# Patient Record
Sex: MALE | Race: WHITE | NOT HISPANIC OR LATINO | Employment: OTHER | URBAN - METROPOLITAN AREA
[De-identification: names, ages, dates, MRNs, and addresses within clinical notes are randomized per-mention and may not be internally consistent; named-entity substitution may affect disease eponyms.]

---

## 2021-07-28 ENCOUNTER — HOSPITAL ENCOUNTER (EMERGENCY)
Facility: CLINIC | Age: 81
Discharge: HOME | End: 2021-07-28
Attending: PHYSICIAN ASSISTANT | Admitting: PHYSICIAN ASSISTANT
Payer: COMMERCIAL

## 2021-07-28 ENCOUNTER — APPOINTMENT (OUTPATIENT)
Dept: GENERAL RADIOLOGY | Facility: CLINIC | Age: 81
End: 2021-07-28
Attending: PHYSICIAN ASSISTANT
Payer: COMMERCIAL

## 2021-07-28 VITALS
SYSTOLIC BLOOD PRESSURE: 141 MMHG | OXYGEN SATURATION: 95 % | HEART RATE: 90 BPM | HEIGHT: 65 IN | WEIGHT: 160 LBS | TEMPERATURE: 98.3 F | DIASTOLIC BLOOD PRESSURE: 78 MMHG | BODY MASS INDEX: 26.66 KG/M2 | RESPIRATION RATE: 16 BRPM

## 2021-07-28 DIAGNOSIS — J06.9 URI (UPPER RESPIRATORY INFECTION): ICD-10-CM

## 2021-07-28 DIAGNOSIS — R05.9 COUGH: ICD-10-CM

## 2021-07-28 LAB — SARS-COV-2 RNA RESP QL NAA+PROBE: NEGATIVE

## 2021-07-28 PROCEDURE — 71045 X-RAY EXAM CHEST 1 VIEW: CPT

## 2021-07-28 PROCEDURE — 99213 OFFICE O/P EST LOW 20 MIN: CPT | Performed by: PHYSICIAN ASSISTANT

## 2021-07-28 PROCEDURE — C9803 HOPD COVID-19 SPEC COLLECT: HCPCS | Performed by: PHYSICIAN ASSISTANT

## 2021-07-28 PROCEDURE — 87635 SARS-COV-2 COVID-19 AMP PRB: CPT | Performed by: PHYSICIAN ASSISTANT

## 2021-07-28 PROCEDURE — G0463 HOSPITAL OUTPT CLINIC VISIT: HCPCS | Mod: 25 | Performed by: PHYSICIAN ASSISTANT

## 2021-07-28 ASSESSMENT — ENCOUNTER SYMPTOMS
RHINORRHEA: 1
ARTHRALGIAS: 0
CONFUSION: 0
COLOR CHANGE: 0
SHORTNESS OF BREATH: 0
FEVER: 0
EYE REDNESS: 0
WHEEZING: 0
NECK STIFFNESS: 0
CHEST TIGHTNESS: 0
COUGH: 1
ABDOMINAL PAIN: 0
HEADACHES: 0
DIFFICULTY URINATING: 0

## 2021-07-28 ASSESSMENT — MIFFLIN-ST. JEOR: SCORE: 1367.64

## 2021-07-28 NOTE — ED PROVIDER NOTES
History     Chief Complaint   Patient presents with     Cough     started yesterday     HPI  Mason Rutledge is a 80 year old male who presents today with cough, chest congestion, runny nose. Patient states runny nose started 2 days ago and cough started yesterday. Patient lives in [place] and her visiting family. Patient denies fevers, chills, headache, sore throat, ear pain, sinus pain, chest pain, shortness of breath, abdominal pain, nausea/vomiting, rash, weakness, dizziness, confusion. No history of asthma, COPD, emphysema, or lung issues. Patient states no known exposures and he is fully vaccinated. Patient has been taking zyrtec daily for allergies and was told by pharmacist yesterday to try mucinex. Cough is productive.     Allergies:  Allergies   Allergen Reactions     Amoxicillin Rash     Septra [Sulfamethoxazole W/Trimethoprim] Rash       Problem List:    There are no problems to display for this patient.       Past Medical History:    No past medical history on file.    Past Surgical History:    No past surgical history on file.    Family History:    No family history on file.    Social History:  Marital Status:   [2]  Social History     Tobacco Use     Smoking status: Not on file   Substance Use Topics     Alcohol use: Not on file     Drug use: Not on file        Medications:    No current outpatient medications on file.        Review of Systems   Constitutional: Negative for fever.   HENT: Positive for congestion, nosebleeds, postnasal drip and rhinorrhea.    Eyes: Negative for redness.   Respiratory: Positive for cough. Negative for chest tightness, shortness of breath and wheezing.    Cardiovascular: Negative for chest pain.   Gastrointestinal: Negative for abdominal pain.   Genitourinary: Negative for difficulty urinating.   Musculoskeletal: Negative for arthralgias and neck stiffness.   Skin: Negative for color change.   Neurological: Negative for headaches.   Psychiatric/Behavioral:  "Negative for confusion.   All other systems reviewed and are negative.      Physical Exam   BP: (!) 141/78  Pulse: 90  Temp: 98.3  F (36.8  C)  Resp: 16  Height: 165.1 cm (5' 5\")  Weight: 72.6 kg (160 lb) (stated)  SpO2: 95 %      Physical Exam  Vitals and nursing note reviewed.   Constitutional:       General: He is not in acute distress.     Appearance: Normal appearance. He is normal weight. He is not ill-appearing or toxic-appearing.   HENT:      Right Ear: Tympanic membrane and ear canal normal.      Left Ear: Tympanic membrane and ear canal normal.      Nose: Congestion and rhinorrhea present.      Mouth/Throat:      Mouth: Mucous membranes are moist.      Pharynx: Oropharynx is clear. No oropharyngeal exudate or posterior oropharyngeal erythema.      Comments: Positive post nasal drainage   Eyes:      General: No scleral icterus.     Extraocular Movements: Extraocular movements intact.      Conjunctiva/sclera: Conjunctivae normal.      Pupils: Pupils are equal, round, and reactive to light.   Cardiovascular:      Rate and Rhythm: Normal rate and regular rhythm.      Heart sounds: Normal heart sounds.   Pulmonary:      Effort: No respiratory distress.      Breath sounds: No stridor. No wheezing or rhonchi.      Comments: Positive coarse breath sounds noted to right middle and lower lobes however this was cleared after patient coughed  Chest:      Chest wall: No tenderness.   Abdominal:      Palpations: Abdomen is soft.      Tenderness: There is no abdominal tenderness.   Musculoskeletal:      Cervical back: Normal range of motion and neck supple.   Lymphadenopathy:      Cervical: No cervical adenopathy.   Skin:     General: Skin is warm.      Capillary Refill: Capillary refill takes less than 2 seconds.      Findings: No erythema or rash.   Neurological:      General: No focal deficit present.      Mental Status: He is alert and oriented to person, place, and time.      Sensory: No sensory deficit.      Motor: " No weakness.      Gait: Gait normal.   Psychiatric:         Mood and Affect: Mood normal.         Behavior: Behavior normal.         Thought Content: Thought content normal.         Judgment: Judgment normal.         ED Course        Procedures             Critical Care time:  none               Results for orders placed or performed during the hospital encounter of 07/28/21 (from the past 24 hour(s))   Symptomatic COVID-19 Virus (Coronavirus) by PCR Nasopharyngeal    Specimen: Nasopharyngeal; Swab    Narrative    The following orders were created for panel order Symptomatic COVID-19 Virus (Coronavirus) by PCR Nasopharyngeal.  Procedure                               Abnormality         Status                     ---------                               -----------         ------                     SARS-COV2 (COVID-19) Vir...[[id number]]  Normal              Final result                 Please view results for these tests on the individual orders.   SARS-COV2 (COVID-19) Virus RT-PCR    Specimen: Nasopharyngeal; Swab   Result Value Ref Range    SARS CoV2 PCR Negative Negative    Narrative    Testing was performed using the janet  SARS-CoV-2 & Influenza A/B Assay on the janet  Leigh  System.  This test should be ordered for the detection of SARS-COV-2 in individuals who meet SARS-CoV-2 clinical and/or epidemiological criteria. Test performance is unknown in asymptomatic patients.  This test is for in vitro diagnostic use under the FDA EUA for laboratories certified under CLIA to perform moderate and/or high complexity testing. This test has not been FDA cleared or approved.  A negative test does not rule out the presence of PCR inhibitors in the specimen or target RNA in concentration below the limit of detection for the assay. The possibility of a false negative should be considered if the patient's recent exposure or clinical presentation suggests COVID-19.  [facility] are certified under the  Clinical Laboratory Improvement Amendments of 1988 (CLIA-88) as qualified to perform moderate and/or high complexity laboratory testing.   XR Chest Port 1 View    Narrative    CHEST ONE VIEW PORTABLE   7/28/2021 5:14 PM     HISTORY: Productive cough that started yesterday.    COMPARISON: None.      Impression    IMPRESSION: Coarsened interstitial markings may be related to chronic  fibrosis. No lobar consolidation, pneumothorax, or pleural effusion.    MIKE CROFT MD         SYSTEM ID:  [id number]       Medications - No data to display    Assessments & Plan (with Medical Decision Making)     I have reviewed the nursing notes.    I have reviewed the findings, diagnosis, plan and need for follow up with the patient.  Mason Rutledge is a 80 year old male who presents today with cough, chest congestion, runny nose. Patient states runny nose started 2 days ago and cough started yesterday. Patient lives in [place] and her visiting family. Patient denies fevers, chills, headache, sore throat, ear pain, sinus pain, chest pain, shortness of breath, abdominal pain, nausea/vomiting, rash, weakness, dizziness, confusion. No history of asthma, COPD, emphysema, or lung issues. Patient states no known exposures and he is fully vaccinated. Patient has been taking zyrtec daily for allergies and was told by pharmacist yesterday to try mucinex. Cough is productive.     See exam findings above.  Occasional coarse breath sounds noted to the right lower middle lobe however this cleared pretty easily after patient coughed.  Wife very concerned about pneumonia and would like chest x-ray done today.  Chest x-ray obtained and shows coarse interstitial markings may be related to chronic fibrosis.  Patient states no history of fibrosis.  No lobar consolidation, pneumothorax or pleural effusion.  X-ray could also be related to viral illness at this time.  No indication for antibiotics.  This was discussed with patient and wife and given on  discharge paperwork.  Patient also had Covid test done today which was negative.  Patient to continue symptomatic treatment over-the-counter and to return if symptoms worsen or change or fevers occur.  Patient in no acute distress and discharged in stable condition.  Patient and wife agree with plan.    There are no discharge medications for this patient.      Final diagnoses:   Cough   URI (upper respiratory infection)       7/28/2021   [facility] EMERGENCY DEPT     Kaye Solorzano PA-C  07/28/21 182

## 2021-07-28 NOTE — DISCHARGE INSTRUCTIONS
Reassurance given to patient.  No evidence for bacterial etiology at this time. Covid test and chest x-ray today were negative   Patient informed to rest, warm compresses over sinuses as needed, stay hydrated, cool humidifier, salt water gargles.  Lots of rest and fluids.  Over the counter cough/cold product of patient's choice as needed and fluids and rest  Return if any worsening symptoms or if not improving.  Go to Emergency Room if SOB, chest pain, painful breathing, worst headache of life, active abdominal pain, or fevers > 104F occur.     Patient voiced understanding of instructions given.

## 2021-08-01 ENCOUNTER — APPOINTMENT (OUTPATIENT)
Dept: GENERAL RADIOLOGY | Facility: CLINIC | Age: 81
End: 2021-08-01
Attending: EMERGENCY MEDICINE
Payer: COMMERCIAL

## 2021-08-01 ENCOUNTER — HOSPITAL ENCOUNTER (EMERGENCY)
Facility: CLINIC | Age: 81
Discharge: HOME | End: 2021-08-01
Attending: EMERGENCY MEDICINE | Admitting: EMERGENCY MEDICINE
Payer: COMMERCIAL

## 2021-08-01 VITALS
WEIGHT: 160 LBS | DIASTOLIC BLOOD PRESSURE: 103 MMHG | HEART RATE: 76 BPM | SYSTOLIC BLOOD PRESSURE: 176 MMHG | BODY MASS INDEX: 26.63 KG/M2 | OXYGEN SATURATION: 94 % | RESPIRATION RATE: 20 BRPM | TEMPERATURE: 97.5 F

## 2021-08-01 DIAGNOSIS — J18.9 COMMUNITY ACQUIRED PNEUMONIA OF RIGHT LUNG, UNSPECIFIED PART OF LUNG: ICD-10-CM

## 2021-08-01 LAB — SARS-COV-2 RNA RESP QL NAA+PROBE: NEGATIVE

## 2021-08-01 PROCEDURE — 71046 X-RAY EXAM CHEST 2 VIEWS: CPT

## 2021-08-01 PROCEDURE — 99285 EMERGENCY DEPT VISIT HI MDM: CPT | Performed by: EMERGENCY MEDICINE

## 2021-08-01 PROCEDURE — C9803 HOPD COVID-19 SPEC COLLECT: HCPCS | Performed by: EMERGENCY MEDICINE

## 2021-08-01 PROCEDURE — 250N000013 HC RX MED GY IP 250 OP 250 PS 637: Performed by: EMERGENCY MEDICINE

## 2021-08-01 PROCEDURE — 87635 SARS-COV-2 COVID-19 AMP PRB: CPT | Performed by: EMERGENCY MEDICINE

## 2021-08-01 PROCEDURE — 99284 EMERGENCY DEPT VISIT MOD MDM: CPT | Mod: 25 | Performed by: EMERGENCY MEDICINE

## 2021-08-01 RX ORDER — CEFDINIR 300 MG/1
300 CAPSULE ORAL ONCE
Status: DISCONTINUED | OUTPATIENT
Start: 2021-08-01 | End: 2021-08-01

## 2021-08-01 RX ORDER — CEFDINIR 300 MG/1
300 CAPSULE ORAL ONCE
Status: COMPLETED | OUTPATIENT
Start: 2021-08-01 | End: 2021-08-01

## 2021-08-01 RX ORDER — CEFDINIR 300 MG/1
300 CAPSULE ORAL 2 TIMES DAILY
Qty: 14 CAPSULE | Refills: 0 | Status: SHIPPED | OUTPATIENT
Start: 2021-08-01 | End: 2021-08-08

## 2021-08-01 RX ORDER — AZITHROMYCIN 250 MG/1
TABLET, FILM COATED ORAL
Qty: 6 TABLET | Refills: 0 | Status: SHIPPED | OUTPATIENT
Start: 2021-08-01 | End: 2021-08-06

## 2021-08-01 RX ADMIN — CEFDINIR 300 MG: 300 CAPSULE ORAL at 18:26

## 2021-08-01 ASSESSMENT — ENCOUNTER SYMPTOMS
DIARRHEA: 0
EYE REDNESS: 0
HEADACHES: 0
ABDOMINAL PAIN: 0
SORE THROAT: 0
EYE PAIN: 0
DYSURIA: 0
FEVER: 0
VOMITING: 0
NAUSEA: 0
CHILLS: 0
COUGH: 1
MYALGIAS: 0
RHINORRHEA: 1
NERVOUS/ANXIOUS: 1
SHORTNESS OF BREATH: 0

## 2021-08-01 NOTE — DISCHARGE INSTRUCTIONS
Take cefdinir twice daily starting tomorrow morning. Take azithromycin as prescribed. Your COVID testing today was negative.     If you have difficulty breathing, worsening symptoms, fever > 100.4, or other new symptoms that you find concerning, you should return to the Emergency Department immediately for further evaluation and management.

## 2021-08-01 NOTE — ED PROVIDER NOTES
"  History     Chief Complaint   Patient presents with     Cough     denies SOA, was seen 2 days ago for the same thing     HPI  Mason Rutledge is a 80 year old male who presents emergency department for evaluation of cough.  Patient lives in [place] and is visiting family.  Has been here for the past 2 weeks.  After arriving, he has been having an unproductive cough.  No fevers, chills or shortness of breath.  Symptoms started with rhinorrhea nasal congestion.  No feels if he has \"phlegm\" stuck in his throat and clears with a cough.  Feels a vibration sense in his right lower chest when he coughs as well.  Patient does have some memory issues but does have a medication list with him.  He takes montelukast, Flonase, atorvastatin, levothyroxine, pantoprazole, and famotidine.  States he has not needed to take his montelukast since moving to [place] because his symptoms improved.  No sore throat.  No abdominal pain patient is a non-smoker.  No ageusia or anosmia.  Was tested for Covid 4 days ago and was negative.  Patient fully vaccinated.  Chart review previous visit states that he was advised to take Mucinex from his pharmacist.      The patient's PMHx, Surgical Hx, Allergies, and Medications were all reviewed with the patient.    Allergies:  Allergies   Allergen Reactions     Amoxicillin Rash     Septra [Sulfamethoxazole W/Trimethoprim] Rash       Problem List:    There are no problems to display for this patient.       Past Medical History:    No past medical history on file.    Past Surgical History:    No past surgical history on file.    Family History:    No family history on file.    Social History:  Marital Status:   [2]  Social History     Tobacco Use     Smoking status: Not on file   Substance Use Topics     Alcohol use: Not on file     Drug use: Not on file        Medications:    azithromycin (ZITHROMAX Z-ALICIA) 250 MG tablet  cefdinir (OMNICEF) 300 MG capsule          Review of Systems "   Constitutional: Negative for chills and fever.   HENT: Positive for rhinorrhea. Negative for sore throat.    Eyes: Negative for pain and redness.   Respiratory: Positive for cough. Negative for shortness of breath.    Cardiovascular: Negative for chest pain.   Gastrointestinal: Negative for abdominal pain, diarrhea, nausea and vomiting.   Genitourinary: Negative for dysuria.   Musculoskeletal: Negative for myalgias.   Skin: Negative for rash.   Neurological: Negative for headaches.   Psychiatric/Behavioral: The patient is nervous/anxious.        Physical Exam   BP: (!) 143/92  Pulse: 81  Temp: 97.5  F (36.4  C)  Resp: 20  Weight: 72.6 kg (160 lb)  SpO2: 95 %    Physical Exam  GEN: Awake, alert, and cooperative. No acute distress.  Sitting upright on side of cart  HENT: MMM. External ears and nose normal bilaterally.  EYES: EOM intact. Conjunctiva clear. No discharge.   NECK: Symmetric, freely mobile.   CV : Extremities warm well perfused  PULM: Normal effort. Rhonchi in right base.   NEURO: Normal speech. Following commands. CN II-XII grossly intact. Answering questions and interacting appropriately.   EXT: No gross deformity.  No pitting lower extremity edema.  INT: Warm. No diaphoresis. Normal color.        ED Course     ED Course as of Aug 01 1900   Sun Aug 01, 2021   1700 Spoke with his wife Shelly on phone to collateral history. Cough for one week. Sx started with sneezing and rhinorrhea. Cough is progressively worsening. Sleeping without difficulty. Uses CPAP. Pt takes zyrtec. Mucinex for past four days.       1859 Patient's wife updated on plan for antibiotics and negative Covid test.  She does confirm that he has a penicillin allergy.        Procedures          Critical Care time:  none               Results for orders placed or performed during the hospital encounter of 08/01/21 (from the past 24 hour(s))   Chest XR,  PA & LAT    Narrative    CHEST TWO VIEWS  8/1/2021 4:18 PM     HISTORY:   Cough.    COMPARISON: 7/28/2021.      Impression    IMPRESSION: Hazy infiltrate at the right lung base medially and at the  right lung apex are suspicious for pneumonia. Lungs are otherwise  clear. No pleural effusions. Heart size and pulmonary vascularity are  within normal limits. Aortic calcification.   Symptomatic COVID-19 Virus (Coronavirus) by PCR Nasopharyngeal    Specimen: Nasopharyngeal; Swab    Narrative    The following orders were created for panel order Symptomatic COVID-19 Virus (Coronavirus) by PCR Nasopharyngeal.  Procedure                               Abnormality         Status                     ---------                               -----------         ------                     SARS-COV2 (COVID-19) Vir...[[id number]]  Normal              Final result                 Please view results for these tests on the individual orders.   SARS-COV2 (COVID-19) Virus RT-PCR    Specimen: Nasopharyngeal; Swab   Result Value Ref Range    SARS CoV2 PCR Negative Negative    Narrative    Testing was performed using the janet  SARS-CoV-2 & Influenza A/B Assay on the janet  Leigh  System.  This test should be ordered for the detection of SARS-COV-2 in individuals who meet SARS-CoV-2 clinical and/or epidemiological criteria. Test performance is unknown in asymptomatic patients.  This test is for in vitro diagnostic use under the FDA EUA for laboratories certified under CLIA to perform moderate and/or high complexity testing. This test has not been FDA cleared or approved.  A negative test does not rule out the presence of PCR inhibitors in the specimen or target RNA in concentration below the limit of detection for the assay. The possibility of a false negative should be considered if the patient's recent exposure or clinical presentation suggests COVID-19.  [facility] are certified under the Clinical Laboratory Improvement Amendments of 1988 (CLIA-88) as qualified to perform moderate and/or high  complexity laboratory testing.       Medications   cefdinir (OMNICEF) capsule 300 mg (300 mg Oral Given 8/1/21 1826)       Assessments & Plan (with Medical Decision Making)   80 year old male with past medical history notable for environmental allergies previously on Flonase and montelukast who is visiting from [place] and has cough.  No fevers, chills or dyspnea reported.  Fully vaccinated for Covid.  Negative Covid test 4 days ago.  Covid testing emergency department today negative.  Chest x-ray obtained today and hazy infiltrate in right lung base medially near apex suspicious for pneumonia.  No pleural effusions.  No pneumothorax.  Images reviewed personally as well as report from radiology which is noted above.  I also reviewed the films from 4 days ago.  Patient is speaking full sentences and SPO2 98% on room air.  Chest x-ray with hazy infiltrate right lung base suspicious for pneumonia.  Pulses in the area were he has adventitious lung sounds.  Given these findings will treat for [facility] pneumonia.  Patient apparently has a penicillin allergy.  We will therefore treat with cefdinir twice daily for 1 week and azithromycin.  Patient was given his initial dose of cefdinir in the emergency department and observed for 30 minutes.  Patient did not have any signs of acute allergic reaction.  Follow-up and ED return precautions discussed with patient as well as his wife over the telephone.  Would expect his symptoms to start to improve in a few days.  If he has any worsening of symptoms, difficulty breathing, shortness of breath, fever greater than 100.4, he should return to the emergency department immediately for further evaluation and treatment.  Prescription sent to preferred pharmacy.        I have reviewed the nursing notes.         New Prescriptions    AZITHROMYCIN (ZITHROMAX Z-ALICIA) 250 MG TABLET    Two tablets on the first day, then one tablet daily for the next 4 days    CEFDINIR (OMNICEF) 300 MG  CAPSULE    Take 1 capsule (300 mg) by mouth 2 times daily for 7 days       Final diagnoses:   Community acquired pneumonia of right lung, unspecified part of lung     Flavio Lima MD    8/1/2021   [facility] EMERGENCY DEPT    Disclaimer: This note consists of words and symbols derived from keyboarding and dictation using voice recognition software.  As a result, there may be errors that have gone undetected.  Please consider this when interpreting information found in this note.             Flavio Lima MD  08/01/21 1908

## 2022-07-23 ENCOUNTER — APPOINTMENT (OUTPATIENT)
Dept: CT IMAGING | Facility: CLINIC | Age: 82
End: 2022-07-23
Attending: FAMILY MEDICINE
Payer: COMMERCIAL

## 2022-07-23 ENCOUNTER — APPOINTMENT (OUTPATIENT)
Dept: GENERAL RADIOLOGY | Facility: CLINIC | Age: 82
End: 2022-07-23
Attending: FAMILY MEDICINE
Payer: COMMERCIAL

## 2022-07-23 ENCOUNTER — ANCILLARY PROCEDURE (OUTPATIENT)
Dept: ULTRASOUND IMAGING | Facility: CLINIC | Age: 82
End: 2022-07-23
Attending: FAMILY MEDICINE
Payer: COMMERCIAL

## 2022-07-23 ENCOUNTER — TELEPHONE (OUTPATIENT)
Dept: OPHTHALMOLOGY | Facility: CLINIC | Age: 82
End: 2022-07-23

## 2022-07-23 ENCOUNTER — HOSPITAL ENCOUNTER (EMERGENCY)
Facility: CLINIC | Age: 82
Discharge: HOME | End: 2022-07-23
Attending: FAMILY MEDICINE | Admitting: FAMILY MEDICINE
Payer: COMMERCIAL

## 2022-07-23 VITALS
RESPIRATION RATE: 16 BRPM | HEART RATE: 87 BPM | WEIGHT: 155 LBS | SYSTOLIC BLOOD PRESSURE: 130 MMHG | OXYGEN SATURATION: 95 % | HEIGHT: 65 IN | DIASTOLIC BLOOD PRESSURE: 81 MMHG | BODY MASS INDEX: 25.83 KG/M2 | TEMPERATURE: 96.8 F

## 2022-07-23 DIAGNOSIS — H53.8 BLURRED VISION: ICD-10-CM

## 2022-07-23 DIAGNOSIS — S39.012A STRAIN OF LUMBAR REGION, INITIAL ENCOUNTER: ICD-10-CM

## 2022-07-23 DIAGNOSIS — S20.212A CHEST WALL CONTUSION, LEFT, INITIAL ENCOUNTER: ICD-10-CM
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
## 2022-07-23 PROBLEM — J47.9 BRONCHIECTASIS (H): Status: ACTIVE | Noted: 2022-03-14

## 2022-07-23 PROBLEM — I50.43 ACUTE ON CHRONIC COMBINED SYSTOLIC AND DIASTOLIC HEART FAILURE (H): Status: ACTIVE | Noted: 2021-12-23

## 2022-07-23 PROBLEM — R01.1 SYSTOLIC MURMUR: Status: ACTIVE | Noted: 2017-08-14

## 2022-07-23 PROBLEM — E83.39 HYPOPHOSPHATEMIA: Status: ACTIVE | Noted: 2021-12-23

## 2022-07-23 PROBLEM — I71.20 THORACIC AORTIC ANEURYSM (H): Status: ACTIVE | Noted: 2018-05-14

## 2022-07-23 PROBLEM — I10 HYPERTENSION: Status: ACTIVE | Noted: 2022-03-14

## 2022-07-23 PROBLEM — I25.10 CORONARY ATHEROSCLEROSIS: Status: ACTIVE | Noted: 2021-12-23

## 2022-07-23 PROBLEM — Z95.1 S/P CABG X 4: Status: ACTIVE | Noted: 2022-01-26

## 2022-07-23 PROBLEM — M47.812 DEGENERATIVE JOINT DISEASE OF CERVICAL SPINE: Status: ACTIVE | Noted: 2020-05-26

## 2022-07-23 PROBLEM — J44.9 COPD (CHRONIC OBSTRUCTIVE PULMONARY DISEASE) (H): Status: ACTIVE | Noted: 2022-03-14

## 2022-07-23 PROCEDURE — 76705 ECHO EXAM OF ABDOMEN: CPT | Mod: 26 | Performed by: FAMILY MEDICINE

## 2022-07-23 PROCEDURE — 76604 US EXAM CHEST: CPT | Mod: 26 | Performed by: FAMILY MEDICINE

## 2022-07-23 PROCEDURE — 76512 OPH US DX B-SCAN: CPT | Mod: 26 | Performed by: FAMILY MEDICINE

## 2022-07-23 PROCEDURE — 99285 EMERGENCY DEPT VISIT HI MDM: CPT | Mod: 25 | Performed by: FAMILY MEDICINE

## 2022-07-23 PROCEDURE — 76604 US EXAM CHEST: CPT | Performed by: FAMILY MEDICINE

## 2022-07-23 PROCEDURE — 76512 OPH US DX B-SCAN: CPT | Mod: RT | Performed by: FAMILY MEDICINE

## 2022-07-23 PROCEDURE — 71046 X-RAY EXAM CHEST 2 VIEWS: CPT

## 2022-07-23 PROCEDURE — 76705 ECHO EXAM OF ABDOMEN: CPT | Performed by: FAMILY MEDICINE

## 2022-07-23 PROCEDURE — 93010 ELECTROCARDIOGRAM REPORT: CPT | Mod: 59 | Performed by: FAMILY MEDICINE

## 2022-07-23 PROCEDURE — 93005 ELECTROCARDIOGRAM TRACING: CPT | Mod: 59 | Performed by: FAMILY MEDICINE

## 2022-07-23 PROCEDURE — 71250 CT THORAX DX C-: CPT

## 2022-07-23 PROCEDURE — 250N000013 HC RX MED GY IP 250 OP 250 PS 637: Performed by: EMERGENCY MEDICINE

## 2022-07-23 PROCEDURE — 93308 TTE F-UP OR LMTD: CPT | Performed by: FAMILY MEDICINE

## 2022-07-23 PROCEDURE — 93308 TTE F-UP OR LMTD: CPT | Mod: 26 | Performed by: FAMILY MEDICINE

## 2022-07-23 PROCEDURE — 250N000009 HC RX 250: Performed by: FAMILY MEDICINE

## 2022-07-23 RX ORDER — ACETAMINOPHEN 500 MG
1000 TABLET ORAL ONCE
Status: COMPLETED | OUTPATIENT
Start: 2022-07-23 | End: 2022-07-23

## 2022-07-23 RX ORDER — TETRACAINE HYDROCHLORIDE 5 MG/ML
1-2 SOLUTION OPHTHALMIC ONCE
Status: COMPLETED | OUTPATIENT
Start: 2022-07-23 | End: 2022-07-23

## 2022-07-23 RX ORDER — OFLOXACIN 3 MG/ML
1-2 SOLUTION/ DROPS OPHTHALMIC
Qty: 5 ML | Refills: 0 | Status: SHIPPED | OUTPATIENT
Start: 2022-07-23

## 2022-07-23 RX ADMIN — ACETAMINOPHEN 1000 MG: 500 TABLET, FILM COATED ORAL at 19:25

## 2022-07-23 RX ADMIN — TETRACAINE HYDROCHLORIDE 2 DROP: 5 SOLUTION OPHTHALMIC at 16:22

## 2022-07-23 ASSESSMENT — ENCOUNTER SYMPTOMS
NAUSEA: 0
BLOOD IN STOOL: 0
VOMITING: 0
CHILLS: 0
HEADACHES: 0
DYSURIA: 0
DIAPHORESIS: 0
PALPITATIONS: 0
FREQUENCY: 0
SORE THROAT: 0
SINUS PRESSURE: 0
WHEEZING: 0
COUGH: 0
DIARRHEA: 0
SHORTNESS OF BREATH: 0
ABDOMINAL PAIN: 0
CONSTIPATION: 0
FEVER: 0

## 2022-07-23 ASSESSMENT — VISUAL ACUITY
OD: 20/80
OS: 20/25
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
## 2022-10-22 ENCOUNTER — HEALTH MAINTENANCE LETTER (OUTPATIENT)
Age: 82
End: 2022-10-22

## 2023-11-05 ENCOUNTER — HEALTH MAINTENANCE LETTER (OUTPATIENT)
Age: 83
End: 2023-11-05

## 2024-12-22 ENCOUNTER — HEALTH MAINTENANCE LETTER (OUTPATIENT)
Age: 84
End: 2024-12-22